# Patient Record
Sex: FEMALE | Race: WHITE
[De-identification: names, ages, dates, MRNs, and addresses within clinical notes are randomized per-mention and may not be internally consistent; named-entity substitution may affect disease eponyms.]

---

## 2018-03-14 ENCOUNTER — HOSPITAL ENCOUNTER (OUTPATIENT)
Dept: HOSPITAL 17 - PHSDC | Age: 68
Discharge: HOME | End: 2018-03-14
Attending: PAIN MEDICINE
Payer: MEDICARE

## 2018-03-14 DIAGNOSIS — M54.5: Primary | ICD-10-CM

## 2018-03-14 PROCEDURE — 99152 MOD SED SAME PHYS/QHP 5/>YRS: CPT

## 2018-03-14 PROCEDURE — 62323 NJX INTERLAMINAR LMBR/SAC: CPT

## 2018-03-14 NOTE — M6
cc:

MIKE Arreola MD

****

 

 

03/14/2018

 

PROCEDURE:

Fluoroscopically guided left L4-5 interlaminar epidural steroid 

injection.

 

History and physical was completed and signed.  Consent was signed.  

Procedure site was marked.  Medications were listed and reconciled.  

Pain score was recorded.  Allergies were noted.  Time out was taken.  

Fluoroscopy time was recorded where applicable.

 

Sedation was administered or directed by Dr. Arreola.  The patient 

was given oxygen.  The patient was monitored by a registered nurse.  

Total procedure time was greater than 15 minutes.

 

IV was started, blood pressure cuff, pulse oximeter and EKG were 

applied.  The patient was placed in the prone position on a Ministerio 

table, sedated with small amounts of propofol titrated to effect.  

Vital signs were monitored and remained stable throughout the 

procedure.  The lumbar area was prepped with alcohol and 10% Betadine 

solution, draped with sterile drapes.  Fluoroscopy was used to 

visualize the L4-5 interlaminar space.  The skin was infiltrated with 

1% Xylocaine using a 27 gauge needle.  Then a 3-1/2 inch 18 gauge 

Klein needle was advanced using fluoroscopic guidance and the loss 

of resistance technique into the epidural space at L4-5 slightly to 

the left of the midline.  There was negative aspiration for blood or 

any other type of fluid and the patient was given 10 mL of 0.5% 

Xylocaine, 80 mg of Depo-Medrol.  Following the procedure the patient 

was taken to the recovery room with stable vital signs, neurologically

intact.

 

 

 

__________________________________

MIKE Arreola MD

 

 

WRM/TL

D: 03/14/2018, 09:05 AM

T: 03/14/2018, 09:22 AM

Visit #: [id number]

Job #: [id number]

## 2018-03-26 ENCOUNTER — HOSPITAL ENCOUNTER (OUTPATIENT)
Dept: HOSPITAL 17 - PHSDC | Age: 68
Discharge: HOME | End: 2018-03-26
Attending: PAIN MEDICINE
Payer: MEDICARE

## 2018-03-26 DIAGNOSIS — M54.2: Primary | ICD-10-CM

## 2018-03-26 PROCEDURE — 64633 DESTROY CERV/THOR FACET JNT: CPT

## 2018-03-26 PROCEDURE — 99153 MOD SED SAME PHYS/QHP EA: CPT

## 2018-03-26 PROCEDURE — 64634 DESTROY C/TH FACET JNT ADDL: CPT

## 2018-03-26 PROCEDURE — 99152 MOD SED SAME PHYS/QHP 5/>YRS: CPT

## 2018-03-26 NOTE — M6
cc:

MIKE Arreola MD

****

 

 

DATE:

03/26/2018

 

PROCEDURE:

Radiofrequency ablation, bilateral cervical facet joints (bilateral 

C3-C4, C4-C5 and C5-C6 facet joints).

 

PROCEDURE NOTE:

History and physical was completed and signed.  Consent was signed.  

Procedure site was marked.  Medications were listed and reconciled.  

Pain score was recorded.  Allergies were noted.  Time out was taken.  

Fluoroscopy time was recorded where applicable.

 

Sedation was administered or directed by Dr. Arreola.  The patient 

was given oxygen.  The patient was monitored by a registered nurse.  

Total procedure time was greater than 15 minutes.

 

Three levels are being done because imaging studies show arthritis in 

all lumbar facet joints and because each facet joint is innervated by 

the medial branches from the nerves above and below that particular 

joint.

 

The patient reported 50% or greater pain relief from previous 

diagnostic facet joint blocks done with fluoroscopic guidance.

 

An IV was started, blood pressure cuff, pulse oximeter and EKG were 

applied. The patient was placed in the prone position on a Ministerio 

table, sedated with small amounts of Versed and fentanyl and propofol 

titrated to effect. Vital signs were monitored and remained stable 

throughout the procedure. The lumbar area was scrubbed with 

antimicrobial solution, prepped with 10% Betadine solution, draped 

with sterile drapes. Fluoroscopy was used in a slightly oblique angle 

(Diogo dog view) to clearly visualize the target areas which were the

cephalad most medial angle of the transverse processes as they met the

pedicle in the anatomical location of the medial branch of the 

posterior primary ramus on the bilateral side at C3-C4, C4-C5 and 

C5-C6 facet joint levels and the angle of the __ sacral ala. The skin 

was infiltrated with 1% Xylocaine using a 27 gauge needle. An 

insulated 20 gauge radiofrequency needle with a 10 mm curved tip was 

advanced to the above-mentioned target areas. Fluoroscopy was used to 

confirm the needle was properly placed and not near the nerve root. At

no time did the patient report any paresthesias down the lower 

extremity. Once properly positioned thermal lesions took place at 80 

degrees Centigrade x 100 seconds at each location. Then, a small 

amount of Depo-Medrol was injected at each location for a total of 40 

mg of Depo-Medrol. Following this the patient was taken to the 

recovery room with stable vital signs, neurologically intact.

 

 

 

 

__________________________________

W. MD SELVIN Mckeon/IVETH

D: 03/26/2018, 08:06 AM

T: 03/26/2018, 08:22 AM

Visit #: [id number]

Job #: [id number]